# Patient Record
Sex: MALE | Race: WHITE | Employment: OTHER | RURAL
[De-identification: names, ages, dates, MRNs, and addresses within clinical notes are randomized per-mention and may not be internally consistent; named-entity substitution may affect disease eponyms.]

---

## 2017-03-30 ENCOUNTER — OFFICE VISIT (OUTPATIENT)
Dept: SURGERY | Age: 72
End: 2017-03-30

## 2017-03-30 VITALS
WEIGHT: 194.3 LBS | DIASTOLIC BLOOD PRESSURE: 67 MMHG | HEART RATE: 70 BPM | HEIGHT: 71 IN | BODY MASS INDEX: 27.2 KG/M2 | SYSTOLIC BLOOD PRESSURE: 157 MMHG

## 2017-03-30 DIAGNOSIS — C44.212 BCC (BASAL CELL CARCINOMA), EAR, RIGHT: Primary | ICD-10-CM

## 2017-03-30 RX ORDER — WARFARIN 4 MG/1
4 TABLET ORAL DAILY
COMMUNITY

## 2017-03-31 NOTE — PROGRESS NOTES
Area on right ear. Unsure how long it has been there. A little sore and bleeds. EXAM:  1 cm ulcerated lesion on right ear lobe    Betadine prep  1% Xylocaine ear block  Excision with 2 mm margins  Cautery  Closed with  5-0  Nylon  Dressed with triple antibiotic ointment and bandaid.   RTO 2 weeks  For suture removal.  Path sent    Electronically signed by Lesa Bourne MD

## 2017-04-13 ENCOUNTER — OFFICE VISIT (OUTPATIENT)
Dept: SURGERY | Age: 72
End: 2017-04-13

## 2017-04-13 VITALS
DIASTOLIC BLOOD PRESSURE: 64 MMHG | BODY MASS INDEX: 27.16 KG/M2 | HEART RATE: 70 BPM | HEIGHT: 71 IN | SYSTOLIC BLOOD PRESSURE: 157 MMHG | WEIGHT: 194 LBS

## 2017-04-13 DIAGNOSIS — C44.212 BCC (BASAL CELL CARCINOMA), EAR, RIGHT: Primary | ICD-10-CM

## 2017-04-13 NOTE — MR AVS SNAPSHOT
Visit Information Date & Time Provider Department Dept. Phone Encounter #  
 4/13/2017  9:20 AM Vianca Ashby, [address] [phone] [id number] Upcoming Health Maintenance Date Due Hepatitis C Screening 1945 DTaP/Tdap/Td series (1 - Tdap) 8/1/1968 FOBT Q 1 YEAR AGE 50-75 8/1/1997 ZOSTER VACCINE AGE 60> 8/1/2007 GLAUCOMA SCREENING Q2Y 8/1/2012 MEDICARE YEARLY EXAM 8/1/2012 LIPID PANEL Q1 10/14/2015 Pneumococcal 65+ High/Highest Risk (2 of 2 - PPSV23) 11/9/2015 HEMOGLOBIN A1C Q6M 11/13/2015 EYE EXAM RETINAL OR DILATED Q1 1/13/2016 FOOT EXAM Q1 5/13/2016 MICROALBUMIN Q1 5/13/2016 INFLUENZA AGE 9 TO ADULT 8/1/2016 Allergies as of 4/13/2017  Review Complete On: 4/13/2017 By: Vianca Ashby MD  
  
 Severity Noted Reaction Type Reactions Amiodarone  03/23/2016    Other (comments) Goiter, weight loss Darvon [Propoxyphene]  05/30/2013    Unknown (comments) Current Immunizations  Reviewed on 9/30/2014 Name Date Influenza Vaccine 10/14/2014, 10/2/2013 Pneumococcal Vaccine (Unspecified Type) 11/9/2010 Not reviewed this visit Vitals BP Pulse Height(growth percentile) Weight(growth percentile) BMI Smoking Status 157/64 (BP 1 Location: Left arm, BP Patient Position: Sitting) 70 5' 11\" (1.803 m) 194 lb (88 kg) 27.06 kg/m2 Former Smoker Vitals History BMI and BSA Data Body Mass Index Body Surface Area  
 27.06 kg/m 2 2.1 m 2 Preferred Pharmacy Pharmacy Name Phone [facility] [address] [place] - [address] [phone] Your Updated Medication List  
  
   
This list is accurate as of: 4/13/17  9:32 AM.  Always use your most recent med list.  
  
  
  
  
 aspirin delayed-release 81 mg tablet Take  by mouth daily. carvedilol 25 mg tablet Commonly known as:  Xi Link Take 1 Tab by mouth two (2) times daily (with meals). cloNIDine HCl 0.2 mg tablet Commonly known as:  CATAPRES Take 1 Tab by mouth three (3) times daily. epoetin boubacar 20,000 unit/mL injection Commonly known as:  PROCRIT  
1 mL by SubCUTAneous route every fourteen (14) days. To maintain Hgb above 10  Indications: ANEMIA DUE TO RENAL FAILURE  
  
 folic acid 1 mg tablet Commonly known as:  FOLVITE  
TAKE ONE TABLET BY MOUTH ONCE DAILY Tacnzjeh-Ujpm-Zjatkk-Hyalur Ac 800-299-65-2 mg Cap Take  by mouth.  
  
 glyBURIDE micronized 6 mg tablet Commonly known as:  Susie Peels TAKE 1 TABLET BY MOUTH BEFORE BREAKFAST AND DINNER  
  
 KALEXATE powder Generic drug:  sodium polystyrene Take 15 g by mouth now. Monday, Wed and Friday. Krill Oil 1000-130(40-80) mg Cap Generic drug:  Krill-OM3-DHA-EPA-OM6-Lip-Astx Take  by mouth. LOSARTAN PO Take  by mouth. NIFEdipine ER 60 mg ER tablet Commonly known as:  PROCARDIA XL Take 60 mg by mouth daily. pravastatin 80 mg tablet Commonly known as:  PRAVACHOL Take 1 tablet by mouth nightly. warfarin 4 mg tablet Commonly known as:  COUMADIN Take 4 mg by mouth daily. Introducing \[facility]\"" & HEALTH SERVICES! Karo Shaw introduces [employer] patient portal. Now you can access parts of your medical record, email your doctor's office, and request medication refills online. 1. In your internet browser, go to [email or web address] 2. Click on the First Time User? Click Here link in the Sign In box. You will see the New Member Sign Up page. 3. Enter your [employer] Access Code exactly as it appears below. You will not need to use this code after youve completed the sign-up process. If you do not sign up before the expiration date, you must request a new code. · [employer] Access Code: [email or web address] Expires: 6/29/2017  1:10 PM 
 
4.  Enter the last four digits of your Social Security Number (xxxx) and Date of Birth (mm/dd/yyyy) as indicated and click Submit. You will be taken to the next sign-up page. 5. Create a [employer] ID. This will be your [employer] login ID and cannot be changed, so think of one that is secure and easy to remember. 6. Create a [employer] password. You can change your password at any time. 7. Enter your Password Reset Question and Answer. This can be used at a later time if you forget your password. 8. Enter your e-mail address. You will receive e-mail notification when new information is available in [address] 9. Click Sign Up. You can now view and download portions of your medical record. 10. Click the Download Summary menu link to download a portable copy of your medical information. If you have questions, please visit the Frequently Asked Questions section of the [employer] website. Remember, [employer] is NOT to be used for urgent needs. For medical emergencies, dial 911. Now available from your iPhone and Android! Please provide this summary of care documentation to your next provider. Your primary care clinician is listed as Marietta Mccarthy. If you have any questions after today's visit, please call [phone].

## 2017-04-13 NOTE — PROGRESS NOTES
Pathology discussed with patient, BCC all gone. Wound looks good.   Sutures out  Steri-stripped  RTO prn

## 2021-09-10 ENCOUNTER — APPOINTMENT (OUTPATIENT)
Dept: CT IMAGING | Age: 76
End: 2021-09-10
Attending: EMERGENCY MEDICINE
Payer: MEDICARE

## 2021-09-10 ENCOUNTER — HOSPITAL ENCOUNTER (EMERGENCY)
Age: 76
Discharge: HOME | End: 2021-09-10
Attending: EMERGENCY MEDICINE
Payer: MEDICARE

## 2021-09-10 VITALS
OXYGEN SATURATION: 93 % | HEART RATE: 71 BPM | DIASTOLIC BLOOD PRESSURE: 92 MMHG | TEMPERATURE: 97.8 F | RESPIRATION RATE: 18 BRPM | SYSTOLIC BLOOD PRESSURE: 158 MMHG

## 2021-09-10 DIAGNOSIS — E16.2 HYPOGLYCEMIA: Primary | ICD-10-CM

## 2021-09-10 LAB
ALBUMIN SERPL-MCNC: 2.9 G/DL (ref 3.5–5)
ALBUMIN/GLOB SERPL: 0.9 {RATIO} (ref 1.1–2.2)
ALP SERPL-CCNC: 128 U/L (ref 45–117)
ALT SERPL-CCNC: 11 U/L (ref 12–78)
ANION GAP SERPL CALC-SCNC: 7 MMOL/L (ref 5–15)
APPEARANCE UR: CLEAR
AST SERPL-CCNC: 13 U/L (ref 15–37)
BACTERIA URNS QL MICRO: NEGATIVE /HPF
BASOPHILS # BLD: 0.1 K/UL (ref 0–0.1)
BASOPHILS NFR BLD: 1 % (ref 0–1)
BILIRUB SERPL-MCNC: 0.8 MG/DL (ref 0.2–1)
BILIRUB UR QL: NEGATIVE
BUN SERPL-MCNC: 26 MG/DL (ref 6–20)
BUN/CREAT SERPL: 9 (ref 12–20)
CALCIUM SERPL-MCNC: 8.4 MG/DL (ref 8.5–10.1)
CHLORIDE SERPL-SCNC: 105 MMOL/L (ref 97–108)
CO2 SERPL-SCNC: 27 MMOL/L (ref 21–32)
COLOR UR: ABNORMAL
COMMENT, HOLDF: NORMAL
CREAT SERPL-MCNC: 2.75 MG/DL (ref 0.7–1.3)
DIFFERENTIAL METHOD BLD: ABNORMAL
EOSINOPHIL # BLD: 0 K/UL (ref 0–0.4)
EOSINOPHIL NFR BLD: 0 % (ref 0–7)
EPITH CASTS URNS QL MICRO: NORMAL /LPF
ERYTHROCYTE [DISTWIDTH] IN BLOOD BY AUTOMATED COUNT: 24.3 % (ref 11.5–14.5)
GLOBULIN SER CALC-MCNC: 3.3 G/DL (ref 2–4)
GLUCOSE BLD STRIP.AUTO-MCNC: 127 MG/DL (ref 65–117)
GLUCOSE BLD STRIP.AUTO-MCNC: 141 MG/DL (ref 65–117)
GLUCOSE BLD STRIP.AUTO-MCNC: 151 MG/DL (ref 65–117)
GLUCOSE BLD STRIP.AUTO-MCNC: 46 MG/DL (ref 65–117)
GLUCOSE SERPL-MCNC: 188 MG/DL (ref 65–100)
GLUCOSE UR STRIP.AUTO-MCNC: NEGATIVE MG/DL
HCT VFR BLD AUTO: 31.9 % (ref 36.6–50.3)
HGB BLD-MCNC: 9.6 G/DL (ref 12.1–17)
HGB UR QL STRIP: NEGATIVE
IMM GRANULOCYTES # BLD AUTO: 0 K/UL (ref 0–0.04)
IMM GRANULOCYTES NFR BLD AUTO: 0 % (ref 0–0.5)
INR PPP: 1.2 (ref 0.9–1.1)
KETONES UR QL STRIP.AUTO: NEGATIVE MG/DL
LEUKOCYTE ESTERASE UR QL STRIP.AUTO: NEGATIVE
LYMPHOCYTES # BLD: 1.1 K/UL (ref 0.8–3.5)
LYMPHOCYTES NFR BLD: 15 % (ref 12–49)
MAGNESIUM SERPL-MCNC: 1.7 MG/DL (ref 1.6–2.4)
MCH RBC QN AUTO: 25.7 PG (ref 26–34)
MCHC RBC AUTO-ENTMCNC: 30.1 G/DL (ref 30–36.5)
MCV RBC AUTO: 85.3 FL (ref 80–99)
MONOCYTES # BLD: 0.7 K/UL (ref 0–1)
MONOCYTES NFR BLD: 10 % (ref 5–13)
NEUTS SEG # BLD: 5.1 K/UL (ref 1.8–8)
NEUTS SEG NFR BLD: 74 % (ref 32–75)
NITRITE UR QL STRIP.AUTO: NEGATIVE
NRBC # BLD: 0 K/UL (ref 0–0.01)
NRBC BLD-RTO: 0 PER 100 WBC
PH UR STRIP: 7.5 [PH] (ref 5–8)
PLATELET # BLD AUTO: 206 K/UL (ref 150–400)
PMV BLD AUTO: 9.9 FL (ref 8.9–12.9)
POTASSIUM SERPL-SCNC: 4.8 MMOL/L (ref 3.5–5.1)
PROT SERPL-MCNC: 6.2 G/DL (ref 6.4–8.2)
PROT UR STRIP-MCNC: 30 MG/DL
PROTHROMBIN TIME: 11.9 SEC (ref 9–11.1)
RBC # BLD AUTO: 3.74 M/UL (ref 4.1–5.7)
RBC #/AREA URNS HPF: NORMAL /HPF (ref 0–5)
RBC MORPH BLD: ABNORMAL
SAMPLES BEING HELD,HOLD: NORMAL
SERVICE CMNT-IMP: ABNORMAL
SODIUM SERPL-SCNC: 139 MMOL/L (ref 136–145)
SP GR UR REFRACTOMETRY: 1.01 (ref 1–1.03)
UROBILINOGEN UR QL STRIP.AUTO: 1 EU/DL (ref 0.2–1)
WBC # BLD AUTO: 7 K/UL (ref 4.1–11.1)
WBC URNS QL MICRO: NORMAL /HPF (ref 0–4)

## 2021-09-10 PROCEDURE — 85025 COMPLETE CBC W/AUTO DIFF WBC: CPT

## 2021-09-10 PROCEDURE — 83735 ASSAY OF MAGNESIUM: CPT

## 2021-09-10 PROCEDURE — 74011250636 HC RX REV CODE- 250/636: Performed by: EMERGENCY MEDICINE

## 2021-09-10 PROCEDURE — 99285 EMERGENCY DEPT VISIT HI MDM: CPT

## 2021-09-10 PROCEDURE — 96374 THER/PROPH/DIAG INJ IV PUSH: CPT

## 2021-09-10 PROCEDURE — 81001 URINALYSIS AUTO W/SCOPE: CPT

## 2021-09-10 PROCEDURE — 70450 CT HEAD/BRAIN W/O DYE: CPT

## 2021-09-10 PROCEDURE — 82962 GLUCOSE BLOOD TEST: CPT

## 2021-09-10 PROCEDURE — 96361 HYDRATE IV INFUSION ADD-ON: CPT

## 2021-09-10 PROCEDURE — 80053 COMPREHEN METABOLIC PANEL: CPT

## 2021-09-10 PROCEDURE — 74011000250 HC RX REV CODE- 250: Performed by: EMERGENCY MEDICINE

## 2021-09-10 PROCEDURE — 36415 COLL VENOUS BLD VENIPUNCTURE: CPT

## 2021-09-10 PROCEDURE — 85610 PROTHROMBIN TIME: CPT

## 2021-09-10 RX ORDER — DEXTROSE 50 % IN WATER (D50W) INTRAVENOUS SYRINGE
25
Status: COMPLETED | OUTPATIENT
Start: 2021-09-10 | End: 2021-09-10

## 2021-09-10 RX ORDER — SODIUM CHLORIDE 0.9 % (FLUSH) 0.9 %
5-10 SYRINGE (ML) INJECTION ONCE
Status: DISCONTINUED | OUTPATIENT
Start: 2021-09-10 | End: 2021-09-10 | Stop reason: HOSPADM

## 2021-09-10 RX ADMIN — SODIUM CHLORIDE 1000 ML: 9 INJECTION, SOLUTION INTRAVENOUS at 09:38

## 2021-09-10 RX ADMIN — DEXTROSE MONOHYDRATE 25 G: 25 INJECTION, SOLUTION INTRAVENOUS at 08:20

## 2021-09-10 NOTE — ED TRIAGE NOTES
Pt reports falling off the couch while he was asleep this morning. Pt arrived via rescue with pt axo x4, no pain, with multiple skin tears on arm and legs , some of which he reports being from a previous fall. BSG=46, MD aware, PIV established and D50 administered

## 2021-09-10 NOTE — ED NOTES
Hourly rounding completed, assessed need for restroom and pain level. Pathway clear from obstructions and personal belongings within reach. Repositioned for comfort.   Breakfast tray provided and set up , pt feeding himself without issues

## 2021-09-10 NOTE — ED PROVIDER NOTES
EMERGENCY DEPARTMENT HISTORY AND PHYSICAL EXAM          Date: 9/10/2021  Patient Name: Jennifer Garcia. History of Presenting Illness     Chief Complaint   Patient presents with    Fall       History Provided By: Patient    HPI: Jennifer Garcia. is a 78 y.o. male, pmhx diabetes, hypertension, high cholesterol, CHF, atrial fibrillation on Coumadin end-stage renal disease status post transplant and no longer requiring dialysis who presents via rescue squad to the ED c/o fall    Patient states he was sleeping on the couch because he fell asleep watching TV last night. He rolled off and fell landing on both arms. He is able to get to his phone and called the rescue squad as he lives alone. On their arrival they found a pleasant elderly male with a blood sugar 57. They did not intervene and transported patient without event. Patient states he currently feels fine. He states his blood sugars are always low in the mornings and he is used to it as they are normally around 60. Patient states he has not talked to his primary care or his endocrinologist about these abnormal numbers. He does note he was recently admitted at [facility] for elevated blood sugar and they recommend he change his 70/25 to Lantus but he has not yet started the medications because he was finishing off his prior insulin. Currently he is taking 10 units twice daily without any sliding scale coverage. Patient specifically denies any recent fevers, chills, nausea, vomiting, diarrhea, abd pain, CP, SOB, urinary sxs, changes in BM, or headache. PCP: Karen Sanderson NP    Allergies: Amiodarone and Darvon  Social Hx: -tobacco, -vaping, -EtOH, -Illicit Drugs; Lives alone    There are no other complaints, changes, or physical findings at this time. Current Outpatient Medications   Medication Sig Dispense Refill    warfarin (COUMADIN) 4 mg tablet Take 4 mg by mouth daily.  LOSARTAN PO Take  by mouth.       folic acid (FOLVITE) 1 mg tablet TAKE ONE TABLET BY MOUTH ONCE DAILY 30 Tab 0    NIFEdipine ER (PROCARDIA XL) 60 mg ER tablet Take 60 mg by mouth daily.  epoetin boubacar (PROCRIT) 20,000 unit/mL injection 1 mL by SubCUTAneous route every fourteen (14) days. To maintain Hgb above 10  Indications: ANEMIA DUE TO RENAL FAILURE (Patient taking differently: 10,000 Units by SubCUTAneous route every fourteen (14) days. To maintain Hgb above 10  Indications: ANEMIA DUE TO RENAL FAILURE) 6 Vial 11    pravastatin (PRAVACHOL) 80 mg tablet Take 1 tablet by mouth nightly. 90 tablet 3    cloNIDine (CATAPRES) 0.2 mg tablet Take 1 Tab by mouth three (3) times daily. 270 Tab 3    carvedilol (COREG) 25 mg tablet Take 1 Tab by mouth two (2) times daily (with meals). 180 Tab 3    aspirin delayed-release 81 mg tablet Take  by mouth daily.  Akmcjnpy-Mhtl-Fjosfg-Hyalur Ac 812-251-51-2 mg cap Take  by mouth.  Krill-OM3-DHA-EPA-OM6-Lip-Astx (KRILL OIL) 1000-130(40-80) mg cap Take  by mouth.  sodium polystyrene (KALEXATE) powder Take 15 g by mouth now. Monday, Wed and Friday.          Past History     Past Medical History:  Past Medical History:   Diagnosis Date    Anemia in chronic renal disease     Asthma     childhood    BPH (benign prostatic hyperplasia)     Cardiorenal syndrome without renal failure     Congestive heart failure, unspecified     Diabetes ([facility] [address]     ED (erectile dysfunction)     History of colon polyps     Hypercholesterolemia     Hypertension     Perirectal abscess        Past Surgical History:  Past Surgical History:   Procedure Laterality Date    HX APPENDECTOMY  1982    ruptured    HX CAROTID ENDARTERECTOMY      HX CATARACT REMOVAL      HX COLONOSCOPY  2015    HX CORONARY ARTERY BYPASS GRAFT      2 vessel    HX CYST REMOVAL      HX HERNIA REPAIR      HX RENAL TRANSPLANT         Family History:  Family History   Problem Relation Age of Onset    Heart Disease Other     Diabetes Other     No Known Problems Mother        Social History:  Social History     Tobacco Use    Smoking status: Former Smoker     Quit date: 1988     Years since quittin.8    Smokeless tobacco: Former User   Substance Use Topics    Alcohol use: No     Comment: minimal    Drug use: No       Allergies: Allergies   Allergen Reactions    Amiodarone Other (comments)     Goiter, weight loss    Darvon [Propoxyphene] Unknown (comments)         Review of Systems   Review of Systems   Constitutional: Negative for activity change, appetite change, chills, fever and unexpected weight change. HENT: Negative for congestion. Eyes: Negative for pain and visual disturbance. Respiratory: Negative for cough and shortness of breath. Cardiovascular: Negative for chest pain. Gastrointestinal: Negative for abdominal pain, diarrhea, nausea and vomiting. Genitourinary: Negative for dysuria. Musculoskeletal: Negative for back pain. Skin: Positive for wound (Bilateral elbows). Negative for rash. Neurological: Negative for headaches. Physical Exam   Physical Exam  Vitals and nursing note reviewed. Constitutional:       Appearance: He is well-developed. He is not diaphoretic. Comments: This is a disheveled elderly male sitting comfortably with elevated blood pressure currently in mild to moderate distress   HENT:      Head: Normocephalic and atraumatic. Eyes:      General:         Right eye: No discharge. Left eye: No discharge. Conjunctiva/sclera: Conjunctivae normal.      Pupils: Pupils are equal, round, and reactive to light. Cardiovascular:      Rate and Rhythm: Normal rate and regular rhythm. Heart sounds: Normal heart sounds. No murmur heard. No friction rub. No gallop. Comments: Fistula right upper extremity with good thrill. Pacemaker left chest  Pulmonary:      Effort: Pulmonary effort is normal. No respiratory distress. Breath sounds: Normal breath sounds.  No wheezing or rales.   Chest:      Chest wall: No tenderness. Abdominal:      General: Bowel sounds are normal. There is no distension. Palpations: Abdomen is soft. There is no mass. Tenderness: There is no abdominal tenderness. There is no guarding or rebound. Hernia: No hernia is present. Musculoskeletal:         General: Normal range of motion. Cervical back: Normal range of motion and neck supple. Right lower leg: No edema. Left lower leg: No edema. Skin:     General: Skin is warm and dry. Coloration: Skin is not jaundiced or pale. Findings: Bruising (Scattered to bilateral upper extremities, bilateral lower extremities and lower abdomen) present. No rash. Comments: Skin tear left forearm and bilateral elbows   Neurological:      Mental Status: He is alert and oriented to person, place, and time. Cranial Nerves: No cranial nerve deficit. Motor: No abnormal muscle tone. Comments: Tremor noted right upper extremity       Diagnostic Study Results     Labs -     Recent Results (from the past 12 hour(s))   GLUCOSE, POC    Collection Time: 09/10/21  8:14 AM   Result Value Ref Range    Glucose (POC) 46 (LL) 65 - 117 mg/dL    Performed by Abdirashid TURPIN)    CBC WITH AUTOMATED DIFF    Collection Time: 09/10/21  8:28 AM   Result Value Ref Range    WBC 7.0 4.1 - 11.1 K/uL    RBC 3.74 (L) 4.10 - 5.70 M/uL    HGB 9.6 (L) 12.1 - 17.0 g/dL    HCT 31.9 (L) 36.6 - 50.3 %    MCV 85.3 80.0 - 99.0 FL    MCH 25.7 (L) 26.0 - 34.0 PG    MCHC 30.1 30.0 - 36.5 g/dL    RDW 24.3 (H) 11.5 - 14.5 %    PLATELET 616 050 - 080 K/uL    MPV 9.9 8.9 - 12.9 FL    NRBC 0.0 0  WBC    ABSOLUTE NRBC 0.00 0.00 - 0.01 K/uL    NEUTROPHILS 74 32 - 75 %    LYMPHOCYTES 15 12 - 49 %    MONOCYTES 10 5 - 13 %    EOSINOPHILS 0 0 - 7 %    BASOPHILS 1 0 - 1 %    IMMATURE GRANULOCYTES 0 0.0 - 0.5 %    ABS. NEUTROPHILS 5.1 1.8 - 8.0 K/UL    ABS. LYMPHOCYTES 1.1 0.8 - 3.5 K/UL    ABS.  MONOCYTES 0.7 0.0 - 1.0 K/UL    ABS. EOSINOPHILS 0.0 0.0 - 0.4 K/UL    ABS. BASOPHILS 0.1 0.0 - 0.1 K/UL    ABS. IMM. GRANS. 0.0 0.00 - 0.04 K/UL    DF AUTOMATED      RBC COMMENTS OVALOCYTES  1+       METABOLIC PANEL, COMPREHENSIVE    Collection Time: 09/10/21  8:28 AM   Result Value Ref Range    Sodium 139 136 - 145 mmol/L    Potassium 4.8 3.5 - 5.1 mmol/L    Chloride 105 97 - 108 mmol/L    CO2 27 21 - 32 mmol/L    Anion gap 7 5 - 15 mmol/L    Glucose 188 (H) 65 - 100 mg/dL    BUN 26 (H) 6 - 20 MG/DL    Creatinine 2.75 (H) 0.70 - 1.30 MG/DL    BUN/Creatinine ratio 9 (L) 12 - 20      GFR est AA 28 (L) >60 ml/min/1.73m2    GFR est non-AA 23 (L) >60 ml/min/1.73m2    Calcium 8.4 (L) 8.5 - 10.1 MG/DL    Bilirubin, total 0.8 0.2 - 1.0 MG/DL    ALT (SGPT) 11 (L) 12 - 78 U/L    AST (SGOT) 13 (L) 15 - 37 U/L    Alk. phosphatase 128 (H) 45 - 117 U/L    Protein, total 6.2 (L) 6.4 - 8.2 g/dL    Albumin 2.9 (L) 3.5 - 5.0 g/dL    Globulin 3.3 2.0 - 4.0 g/dL    A-G Ratio 0.9 (L) 1.1 - 2.2     MAGNESIUM    Collection Time: 09/10/21  8:28 AM   Result Value Ref Range    Magnesium 1.7 1.6 - 2.4 mg/dL   SAMPLES BEING HELD    Collection Time: 09/10/21  8:28 AM   Result Value Ref Range    SAMPLES BEING HELD 1SST, 1RED, 1BLUE     COMMENT        Add-on orders for these samples will be processed based on acceptable specimen integrity and analyte stability, which may vary by analyte.    PROTHROMBIN TIME + INR    Collection Time: 09/10/21  8:28 AM   Result Value Ref Range    INR 1.2 (H) 0.9 - 1.1      Prothrombin time 11.9 (H) 9.0 - 11.1 sec   GLUCOSE, POC    Collection Time: 09/10/21  8:35 AM   Result Value Ref Range    Glucose (POC) 151 (H) 65 - 117 mg/dL    Performed by Rito Martínez ([place])    URINALYSIS W/ RFLX MICROSCOPIC    Collection Time: 09/10/21  8:39 AM   Result Value Ref Range    Color YELLOW/STRAW      Appearance CLEAR CLEAR      Specific gravity 1.015 1.003 - 1.030      pH (UA) 7.5 5.0 - 8.0      Protein 30 (A) NEG mg/dL    Glucose Negative NEG mg/dL    Ketone Negative NEG mg/dL    Bilirubin Negative NEG      Blood Negative NEG      Urobilinogen 1.0 0.2 - 1.0 EU/dL    Nitrites Negative NEG      Leukocyte Esterase Negative NEG     URINE MICROSCOPIC ONLY    Collection Time: 09/10/21  8:39 AM   Result Value Ref Range    WBC 0-4 0 - 4 /hpf    RBC 0-5 0 - 5 /hpf    Epithelial cells FEW FEW /lpf    Bacteria Negative NEG /hpf   GLUCOSE, POC    Collection Time: 09/10/21  9:32 AM   Result Value Ref Range    Glucose (POC) 127 (H) 65 - 117 mg/dL    Performed by Jazz Sherman (RN)    GLUCOSE, POC    Collection Time: 09/10/21 10:39 AM   Result Value Ref Range    Glucose (POC) 141 (H) 65 - 117 mg/dL    Performed by Lilian METZGER        Radiologic Studies -   CT HEAD WO CONT   Final Result   No acute abnormality. CT Results  (Last 48 hours)               09/10/21 0903  CT HEAD WO CONT Final result    Impression:  No acute abnormality. Narrative:  EXAM: CT HEAD WO CONT       INDICATION: fall on coumadin       COMPARISON: None. CONTRAST: None. TECHNIQUE: Unenhanced CT of the head was performed using 5 mm images. Brain and   bone windows were generated. Coronal and sagittal reformats. CT dose reduction   was achieved through use of a standardized protocol tailored for this   examination and automatic exposure control for dose modulation. FINDINGS:   The ventricles and sulci are normal in size, shape and configuration. . There is   no significant white matter disease. There is no intracranial hemorrhage,   extra-axial collection, or mass effect. The basilar cisterns are open. No CT   evidence of acute infarct. The bone windows demonstrate no abnormalities. The visualized portions of the   paranasal sinuses and mastoid air cells are clear. Vascular calcification is   noted. CXR Results  (Last 48 hours)    None            Medical Decision Making   I am the first provider for this patient.     I reviewed the vital signs, available nursing notes, past medical history, past surgical history, family history and social history. Vital Signs-Reviewed the patient's vital signs. Patient Vitals for the past 12 hrs:   Temp Pulse Resp BP SpO2   09/10/21 1123  71 18 (!) 158/92 93 %   09/10/21 0939  74 20 (!) 188/89 94 %   09/10/21 0843   18     09/10/21 0826 97.8 °F (36.6 °C) 70 26 (!) 170/89 95 %       Pulse Oximetry Analysis - 95% on RA    Cardiac Monitor:   Rate: 70bpm  Rhythm: Paced      Records Reviewed: Nursing Notes, Old Medical Records, Previous Radiology Studies and Previous Laboratory Studies    Provider Notes (Medical Decision Making):   MDM: Elderly diabetic who lives alone and seems to be managing self okay but he is definitely not using all resources available to him to control his diabetes. He did see any evidence of acute traumatic injury requiring x-ray at this time but given his Coumadin use, CT head to be obtained to rule out any intracranial pathology. ED Course:   Initial assessment performed. The patients presenting problems have been discussed, and they are in agreement with the care plan formulated and outlined with them. I have encouraged them to ask questions as they arise throughout their visit. PROGRESS NOTE:    ED Course as of Sep 10 1724   Fri Sep 10, 2021   0910 Labs reviewed and notable for creatinine of 2.75. Last set of labs from 1 year ago showed it at 2.7. When patient urinated his urine is orange but does have protein. Specific gravity 1.015. IV fluids to be provided, we will recheck his sugar and if CT normal will allow him to eat breakfast.    [JT]      ED Course User Index  [JT] Hanh Ulloa MD      10 AM  Patient doing well blood sugar remains greater than 100 patient will eat breakfast and will continue to monitor. 11:15 AM  Blood sugar remains normal.  Patient appears appropriate for discharge.   We discussed the fact that he is hypoglycemic every morning and that patient has not told his primary care doctor. I explained that his doctor would want to know this so it is endocrinologist.  Jade Blair he decrease his nighttime insulin but he was told by his endocrinologist if ever we wanted to adjust his medication that he did need to stop his glyburide. I do recommend he monitors his blood sugars over the weekend and follow-up with his primary care to review the Monday while he awaits his appointment with endocrinology. Discharge note:  Pt re-evaluated and noted to be feeling better, ready for discharge. Updated pt on all final lab findings. Will follow up as instructed. All questions have been answered, pt voiced understanding and agreement with plan. Specific return precautions provided as well as instructions to return to the ED should sx worsen at any time. Vital signs stable for discharge. Critical Care Time:   0      Diagnosis     Clinical Impression:   1. Hypoglycemia        PLAN:  1. Discharge Medication List as of 9/10/2021 10:58 AM      CONTINUE these medications which have NOT CHANGED    Details   warfarin (COUMADIN) 4 mg tablet Take 4 mg by mouth daily. , Historical Med      LOSARTAN PO Take  by mouth., Historical Med      folic acid (FOLVITE) 1 mg tablet TAKE ONE TABLET BY MOUTH ONCE DAILY, NormalPlease advise pt has 30 days to be seen has not been seen in over a year and that we do appts only nowDisp-30 Tab, R-0      NIFEdipine ER (PROCARDIA XL) 60 mg ER tablet Take 60 mg by mouth daily. , Historical Med      epoetin boubacar (PROCRIT) 20,000 unit/mL injection 1 mL by SubCUTAneous route every fourteen (14) days. To maintain Hgb above 10  Indications: ANEMIA DUE TO RENAL FAILURE, Normal, Disp-6 Vial, R-11      pravastatin (PRAVACHOL) 80 mg tablet Take 1 tablet by mouth nightly., Normal, Disp-90 tablet, R-3      cloNIDine (CATAPRES) 0.2 mg tablet Take 1 Tab by mouth three (3) times daily. , Normal, Disp-270 Tab, R-3      carvedilol (COREG) 25 mg tablet Take 1 Tab by mouth two (2) times daily (with meals). , Normal, Disp-180 Tab, R-3      aspirin delayed-release 81 mg tablet Take  by mouth daily. , Historical Med      Zgexybwf-Nkfl-Vgnwme-Hyalur Ac 466-846-75-2 mg cap Take  by mouth., Historical Med      Krill-OM3-DHA-EPA-OM6-Lip-Astx (KRILL OIL) 1000-130(40-80) mg cap Take  by mouth., Historical Med      sodium polystyrene (KALEXATE) powder Take 15 g by mouth now. Monday, Wed and Friday., Historical Med           2. Follow-up Information     Follow up With Specialties Details Why Contact Info    Mayda Morillo NP Nurse Practitioner Schedule an appointment as soon as possible for a visit  to recheck your vitals and review blood sugar levels Monday [address] Emergency Medicine  If symptoms worsen with continued low blood sugar [address]  [address] [facility] [address] [phone]        Return to ED if worse     Disposition:  Home       Please note, this dictation was completed with [email or web address], the [employer] voice recognition software. Quite often unanticipated grammatical, syntax, homophones, and other interpretive errors are inadvertently transcribed by the computer software. Please disregard these errors. Please excuse any errors that have escaped final proof reading.

## 2021-09-10 NOTE — DISCHARGE INSTRUCTIONS
You were seen in the ER for a fall and low blood sugar. Given that your blood sugars are low every morning, I think you should stop taking your glipizide.   Please call your primary care doctor to make an appointment on Monday and review your blood sugars from over the weekend while you are awaiting your appointment with your endocrinologist.

## 2021-09-10 NOTE — ED NOTES
I have reviewed discharge instructions with the patient. The patient verbalized understanding. Discharge medications discussed with patient. No questions at this time.

## 2021-09-17 ENCOUNTER — APPOINTMENT (OUTPATIENT)
Dept: CT IMAGING | Age: 76
End: 2021-09-17
Attending: FAMILY MEDICINE
Payer: MEDICARE

## 2021-09-17 ENCOUNTER — HOSPITAL ENCOUNTER (EMERGENCY)
Age: 76
Discharge: HOME | End: 2021-09-18
Attending: FAMILY MEDICINE
Payer: MEDICARE

## 2021-09-17 VITALS
OXYGEN SATURATION: 96 % | TEMPERATURE: 97.9 F | HEART RATE: 76 BPM | BODY MASS INDEX: 28.31 KG/M2 | WEIGHT: 203 LBS | SYSTOLIC BLOOD PRESSURE: 194 MMHG | RESPIRATION RATE: 16 BRPM | DIASTOLIC BLOOD PRESSURE: 97 MMHG

## 2021-09-17 DIAGNOSIS — E83.42 HYPOMAGNESEMIA: ICD-10-CM

## 2021-09-17 DIAGNOSIS — T14.8XXA MULTIPLE SKIN TEARS: ICD-10-CM

## 2021-09-17 DIAGNOSIS — E16.2 HYPOGLYCEMIA: Primary | ICD-10-CM

## 2021-09-17 DIAGNOSIS — R29.6 FALLS FREQUENTLY: ICD-10-CM

## 2021-09-17 LAB
ALBUMIN SERPL-MCNC: 3.1 G/DL (ref 3.5–5)
ALBUMIN/GLOB SERPL: 0.8 {RATIO} (ref 1.1–2.2)
ALP SERPL-CCNC: 128 U/L (ref 45–117)
ALT SERPL-CCNC: 10 U/L (ref 12–78)
ANION GAP SERPL CALC-SCNC: 14 MMOL/L (ref 5–15)
AST SERPL-CCNC: 14 U/L (ref 15–37)
BILIRUB SERPL-MCNC: 1.5 MG/DL (ref 0.2–1)
BUN SERPL-MCNC: 20 MG/DL (ref 6–20)
BUN/CREAT SERPL: 10 (ref 12–20)
CALCIUM SERPL-MCNC: 8.8 MG/DL (ref 8.5–10.1)
CHLORIDE SERPL-SCNC: 102 MMOL/L (ref 97–108)
CO2 SERPL-SCNC: 23 MMOL/L (ref 21–32)
CREAT SERPL-MCNC: 2.06 MG/DL (ref 0.7–1.3)
GLOBULIN SER CALC-MCNC: 3.8 G/DL (ref 2–4)
GLUCOSE SERPL-MCNC: 108 MG/DL (ref 65–100)
INR PPP: 1.2 (ref 0.9–1.1)
MAGNESIUM SERPL-MCNC: 1.5 MG/DL (ref 1.6–2.4)
POTASSIUM SERPL-SCNC: 4.5 MMOL/L (ref 3.5–5.1)
PROT SERPL-MCNC: 6.9 G/DL (ref 6.4–8.2)
PROTHROMBIN TIME: 12.1 SEC (ref 9–11.1)
SODIUM SERPL-SCNC: 139 MMOL/L (ref 136–145)

## 2021-09-17 PROCEDURE — 99284 EMERGENCY DEPT VISIT MOD MDM: CPT

## 2021-09-17 PROCEDURE — 36415 COLL VENOUS BLD VENIPUNCTURE: CPT

## 2021-09-17 PROCEDURE — 85610 PROTHROMBIN TIME: CPT

## 2021-09-17 PROCEDURE — 70450 CT HEAD/BRAIN W/O DYE: CPT

## 2021-09-17 PROCEDURE — 85025 COMPLETE CBC W/AUTO DIFF WBC: CPT

## 2021-09-17 PROCEDURE — 80053 COMPREHEN METABOLIC PANEL: CPT

## 2021-09-17 PROCEDURE — 72125 CT NECK SPINE W/O DYE: CPT

## 2021-09-17 PROCEDURE — 83735 ASSAY OF MAGNESIUM: CPT

## 2021-09-17 RX ORDER — CALCIUM CARBONATE 300MG(750)
400 TABLET,CHEWABLE ORAL DAILY
Qty: 30 EACH | Refills: 0 | Status: SHIPPED | OUTPATIENT
Start: 2021-09-17 | End: 2021-10-17

## 2021-09-17 RX ORDER — SODIUM CHLORIDE 0.9 % (FLUSH) 0.9 %
5-10 SYRINGE (ML) INJECTION ONCE
Status: DISCONTINUED | OUTPATIENT
Start: 2021-09-17 | End: 2021-09-18 | Stop reason: HOSPADM

## 2021-09-18 LAB
BASOPHILS # BLD: 0 K/UL (ref 0–0.1)
BASOPHILS NFR BLD: 0 % (ref 0–1)
DIFFERENTIAL METHOD BLD: ABNORMAL
EOSINOPHIL # BLD: 0 K/UL (ref 0–0.4)
EOSINOPHIL NFR BLD: 0 % (ref 0–7)
ERYTHROCYTE [DISTWIDTH] IN BLOOD BY AUTOMATED COUNT: 22.6 % (ref 11.5–14.5)
HCT VFR BLD AUTO: 35.3 % (ref 36.6–50.3)
HGB BLD-MCNC: 10.9 G/DL (ref 12.1–17)
IMM GRANULOCYTES # BLD AUTO: 0 K/UL (ref 0–0.04)
IMM GRANULOCYTES NFR BLD AUTO: 0 % (ref 0–0.5)
LYMPHOCYTES # BLD: 0.8 K/UL (ref 0.8–3.5)
LYMPHOCYTES NFR BLD: 11 % (ref 12–49)
MCH RBC QN AUTO: 26.2 PG (ref 26–34)
MCHC RBC AUTO-ENTMCNC: 30.9 G/DL (ref 30–36.5)
MCV RBC AUTO: 84.9 FL (ref 80–99)
MONOCYTES # BLD: 0.2 K/UL (ref 0–1)
MONOCYTES NFR BLD: 3 % (ref 5–13)
NEUTS BAND NFR BLD MANUAL: 3 %
NEUTS SEG # BLD: 6.3 K/UL (ref 1.8–8)
NEUTS SEG NFR BLD: 83 % (ref 32–75)
NRBC # BLD: 0 K/UL (ref 0–0.01)
NRBC BLD-RTO: 0 PER 100 WBC
PLATELET # BLD AUTO: 217 K/UL (ref 150–400)
PLATELET COMMENTS,PCOM: ABNORMAL
PMV BLD AUTO: 9.9 FL (ref 8.9–12.9)
RBC # BLD AUTO: 4.16 M/UL (ref 4.1–5.7)
RBC MORPH BLD: ABNORMAL
WBC # BLD AUTO: 7.3 K/UL (ref 4.1–11.1)

## 2021-09-18 NOTE — ED PROVIDER NOTES
Patient is a 72-year-old male with a history of asthma, BPH, congestive heart failure, diabetes, hyperlipidemia, hypertension, atrial fibrillation, V. tach, coronary artery disease who lives by himself. Patient was at home tonight when he lost his footing and fell. He states he was unable to get up secondary to weakness. He felt that his sugar was low. He has had multiple falls and multiple ED presentations for falls. He is on Coumadin for history of A. fib and V. tach. . He may have hit his head. He continues to have a headache after a fall about a week or 2 ago when he struck his head. Head CT at that time was negative. Patient also complains of upper neck pain since the fall which has been persistent. He has no numbness or weakness in the upper extremities or legs. He has no difficulty breathing. He has no chest pain heaviness pressure. No arm or jaw pain is noted. No abdominal pain is noted. No low back pain is noted. No lower extremity pain is noted. He has multiple skin tears on his right arm. 911 was called and patient was transported to hospital.  His bedside sugar was noted to be in the low 70s at home. He was given oral glucose with increase in his sugar to the upper 70s. Patient stated that he felt remarkably better. Patient stated upon arrival he felt back at baseline.            Past Medical History:   Diagnosis Date    Anemia in chronic renal disease     Asthma     childhood    BPH (benign prostatic hyperplasia)     Cardiorenal syndrome without renal failure     Congestive heart failure, unspecified     Diabetes ([address]     ED (erectile dysfunction)     History of colon polyps     Hypercholesterolemia     Hypertension     Perirectal abscess        Past Surgical History:   Procedure Laterality Date    HX APPENDECTOMY  1982    ruptured    HX CAROTID ENDARTERECTOMY      HX CATARACT REMOVAL      HX COLONOSCOPY  2015    HX CORONARY ARTERY BYPASS GRAFT      2 vessel    HX CYST REMOVAL      HX HERNIA REPAIR      HX RENAL TRANSPLANT           Family History:   Problem Relation Age of Onset    Heart Disease Other     Diabetes Other     No Known Problems Mother        Social History     Socioeconomic History    Marital status:      Spouse name: Not on file    Number of children: Not on file    Years of education: Not on file    Highest education level: Not on file   Occupational History    Not on file   Tobacco Use    Smoking status: Former Smoker     Quit date: 1988     Years since quittin.8    Smokeless tobacco: Former User   Substance and Sexual Activity    Alcohol use: No     Comment: minimal    Drug use: No    Sexual activity: Not Currently   Other Topics Concern    Not on file   Social History Narrative    Not on file     Social Determinants of Health     Financial Resource Strain:     Difficulty of Paying Living Expenses:    Food Insecurity:     Worried About Running Out of Food in the Last Year:     [address] in the Last Year:    Transportation Needs:     Lack of Transportation (Medical):  Lack of Transportation (Non-Medical):    Physical Activity:     Days of Exercise per Week:     Minutes of Exercise per Session:    Stress:     Feeling of Stress :    Social Connections:     Frequency of Communication with Friends and Family:     Frequency of Social Gatherings with Friends and Family:     Attends Zoroastrian Services:     Active Member of Clubs or Organizations:     Attends Club or Organization Meetings:     Marital Status:    Intimate Partner Violence:     Fear of Current or Ex-Partner:     Emotionally Abused:     Physically Abused:     Sexually Abused: ALLERGIES: Amiodarone and Darvon [propoxyphene]    Review of Systems   All other systems reviewed and are negative.       Vitals:    21 2112   BP: (!) 194/97   Pulse: 76   Resp: 16   Temp: 97.9 °F (36.6 °C)   SpO2: 96%   Weight: 92.1 kg (203 lb)            Physical Exam  Vitals and nursing note reviewed. Constitutional:       General: He is not in acute distress. Appearance: He is not ill-appearing, toxic-appearing or diaphoretic. Comments: Patient was somewhat unkempt and thin   HENT:      Head: Normocephalic and atraumatic. Right Ear: External ear normal.      Left Ear: External ear normal.      Nose: Nose normal.      Mouth/Throat:      Mouth: Mucous membranes are moist.      Pharynx: No oropharyngeal exudate or posterior oropharyngeal erythema. Eyes:      Extraocular Movements: Extraocular movements intact. Conjunctiva/sclera: Conjunctivae normal.      Pupils: Pupils are equal, round, and reactive to light. Neck:      Comments: Mild tenderness of paraspinous muscles noted, no midline tenderness was noted. Cardiovascular:      Rate and Rhythm: Normal rate and regular rhythm. Heart sounds: No murmur heard. No friction rub. No gallop. Pulmonary:      Effort: Pulmonary effort is normal. No respiratory distress. Breath sounds: Normal breath sounds. No wheezing or rales. Abdominal:      General: There is no distension. Palpations: Abdomen is soft. Tenderness: There is no abdominal tenderness. There is no right CVA tenderness, left CVA tenderness, guarding or rebound. Musculoskeletal:         General: Signs of injury present. No swelling, tenderness or deformity. Normal range of motion. Cervical back: Normal range of motion and neck supple. No muscular tenderness. Right lower leg: No edema. Left lower leg: No edema. Comments: Superficial skin tears noted to right elbow and right hand. No bony pain to tenderness of the extremities or axial skeleton. Lymphadenopathy:      Cervical: No cervical adenopathy. Skin:     General: Skin is warm and dry. Capillary Refill: Capillary refill takes less than 2 seconds. Findings: No bruising, erythema or rash. Comments: Skin tears as noted.   Patient also has diffuse bruising of the arms and lower extremities. Neurological:      General: No focal deficit present. Mental Status: He is alert and oriented to person, place, and time. Cranial Nerves: No cranial nerve deficit. Sensory: No sensory deficit. Motor: No weakness. Coordination: Coordination normal.      Gait: Gait normal.   Psychiatric:         Mood and Affect: Mood normal.         Behavior: Behavior normal.         Thought Content: Thought content normal.         Judgment: Judgment normal.        Labs -  Recent Results (from the past 24 hour(s))   CBC WITH AUTOMATED DIFF    Collection Time: 09/17/21 10:05 PM   Result Value Ref Range    WBC 7.3 4.1 - 11.1 K/uL    RBC 4.16 4.10 - 5.70 M/uL    HGB 10.9 (L) 12.1 - 17.0 g/dL    HCT 35.3 (L) 36.6 - 50.3 %    MCV 84.9 80.0 - 99.0 FL    MCH 26.2 26.0 - 34.0 PG    MCHC 30.9 30.0 - 36.5 g/dL    RDW 22.6 (H) 11.5 - 14.5 %    PLATELET 136 681 - 374 K/uL    MPV 9.9 8.9 - 12.9 FL    NRBC 0.0 0  WBC    ABSOLUTE NRBC 0.00 0.00 - 0.01 K/uL    NEUTROPHILS 83 (H) 32 - 75 %    BAND NEUTROPHILS 3 %    LYMPHOCYTES 11 (L) 12 - 49 %    MONOCYTES 3 (L) 5 - 13 %    EOSINOPHILS 0 0 - 7 %    BASOPHILS 0 0 - 1 %    IMMATURE GRANULOCYTES 0 0.0 - 0.5 %    ABS. NEUTROPHILS 6.3 1.8 - 8.0 K/UL    ABS. LYMPHOCYTES 0.8 0.8 - 3.5 K/UL    ABS. MONOCYTES 0.2 0.0 - 1.0 K/UL    ABS. EOSINOPHILS 0.0 0.0 - 0.4 K/UL    ABS. BASOPHILS 0.0 0.0 - 0.1 K/UL    ABS. IMM.  GRANS. 0.0 0.00 - 0.04 K/UL    DF MANUAL      PLATELET COMMENTS Giant platelets      RBC COMMENTS OVALOCYTES  1+        RBC COMMENTS PARISH CELLS  1+        RBC COMMENTS MICROCYTOSIS  1+       METABOLIC PANEL, COMPREHENSIVE    Collection Time: 09/17/21 10:05 PM   Result Value Ref Range    Sodium 139 136 - 145 mmol/L    Potassium 4.5 3.5 - 5.1 mmol/L    Chloride 102 97 - 108 mmol/L    CO2 23 21 - 32 mmol/L    Anion gap 14 5 - 15 mmol/L    Glucose 108 (H) 65 - 100 mg/dL    BUN 20 6 - 20 MG/DL    Creatinine 2.06 (H) 0.70 - 1.30 MG/DL    BUN/Creatinine ratio 10 (L) 12 - 20      GFR est AA 38 (L) >60 ml/min/1.73m2    GFR est non-AA 32 (L) >60 ml/min/1.73m2    Calcium 8.8 8.5 - 10.1 MG/DL    Bilirubin, total 1.5 (H) 0.2 - 1.0 MG/DL    ALT (SGPT) 10 (L) 12 - 78 U/L    AST (SGOT) 14 (L) 15 - 37 U/L    Alk. phosphatase 128 (H) 45 - 117 U/L    Protein, total 6.9 6.4 - 8.2 g/dL    Albumin 3.1 (L) 3.5 - 5.0 g/dL    Globulin 3.8 2.0 - 4.0 g/dL    A-G Ratio 0.8 (L) 1.1 - 2.2     MAGNESIUM    Collection Time: 09/17/21 10:05 PM   Result Value Ref Range    Magnesium 1.5 (L) 1.6 - 2.4 mg/dL   PROTHROMBIN TIME + INR    Collection Time: 09/17/21 10:05 PM   Result Value Ref Range    INR 1.2 (H) 0.9 - 1.1      Prothrombin time 12.1 (H) 9.0 - 11.1 sec     Radiologic Studies -   CT Results  (Last 48 hours)               09/17/21 2216  CT HEAD WO CONT Final result    Impression:  No acute intracranial process. Imaging findings consistent with mild chronic microvascular ischemic change. There is a moderate to severe degree of cerebral atrophy. Narrative:  CLINICAL HISTORY: head trauma, on coumadin   INDICATION: head trauma, on coumadin   COMPARISON: 9/10/2021. CT dose reduction was achieved through use of a standardized protocol tailored   for this examination and automatic exposure control for dose modulation. TECHNIQUE: Serial axial images with a collimation of 5 mm were obtained from the   skull base through the vertex     FINDINGS:    There is sulcal and ventricular prominence. Confluent periventricular and   scattered foci of hypodensity in the cerebral white matter. There is no evidence   of an acute infarction, hemorrhage, or mass-effect. There is no evidence of   midline shift or hydrocephalus. Posterior fossa structures are unremarkable. No   extra-axial collections are seen. Mastoid air cells are well pneumatized and clear. There is no evidence of depressed skull fractures of soft tissue swelling. 09/17/21 2216  CT SPINE CERV WO CONT Final result    Impression:  There is no acute fracture or dislocation identified. Narrative:  EXAM: CT SPINE CERV WO CONT   CLINICAL HISTORY: pain after fall   INDICATION: pain after fall   COMPARISON:  None   TECHNIQUE:  Axial neck CT was performed. Noncontrast imaging obtained. Coronal   and sagittal reconstructions were performed. CT dose reduction was achieved through use of a standardized protocol tailored   for this examination and automatic exposure control for dose modulation. Osseous/bone algorithm was utilized. FINDINGS:   The vertebral bodies are anatomically aligned. There is no evidence of fracture   or subluxation. The prevertebral soft tissues are grossly within normal limits. The atlantodental interval is within normal limits. The craniocervical junction   is intact. There is no significant degree of canal or foraminal compromise demonstrated.              XR Results (most recent):      MDM  Number of Diagnoses or Management Options  Falls frequently: established and worsening  Hypoglycemia: established and worsening  Hypomagnesemia: minor  Multiple skin tears: minor     Amount and/or Complexity of Data Reviewed  Clinical lab tests: reviewed and ordered  Tests in the radiology section of CPT®: reviewed and ordered  Tests in the medicine section of CPT®: ordered and reviewed  Decide to obtain previous medical records or to obtain history from someone other than the patient: yes  Obtain history from someone other than the patient: yes (Patient's son in [place]    Risk of Complications, Morbidity, and/or Mortality  Presenting problems: high  Diagnostic procedures: high  Management options: low  General comments: Patient presented with fall and possible hypoglycemia he is on Coumadin so he could have had intracerebral hemorrhage subdural or epidural, patient was complaining of neck pain since prior fall which could be a an occult fracture, metabolic abnormality, electrolyte abnormality, infection, fracture of arms, skin tears. .[  ED Course as of Sep 18 0550   Sat Sep 18, 2021   0543 Labs are reviewed. Magnesium is borderline low at 1.5, CBC with hemoglobin 10.9 which is improved from patient's last hemoglobin of 9.6, BUN and creatinine were 22.06 which is improved from patient's prior creatinine, rest of CMP essentially is stable, PT is 12.1 with an INR 1.2 which is subtherapeutic for patient's atrial fibrillation, CT of the head neck were without acute process. [PS]   8829 Care discussed with patient's son and patient on cell phone. Discussed the fact that the patient was having multiple falls and having a hard time managing his sugar living by himself. I encouraged he and his son to discuss patient either moving closer to son or changing his living situation. We discussed the possibility of falling and breaking a hip or other injury. Son was supportive of patient changing his living situation to when it safer for him. Patient does not want to do this at this time. Patient was stable for discharge and we had to call neighbors to come pick him up. [PS]      ED Course User Index  [PS] Kirill Olivier MD     Medications - No data to display    No data found. Discharge note:  Pt re-evaluated and noted to be feeling better , ready for discharge. Updated pt  on all final lab and  X-ray  findings. Will follow up as instructed . All questions have been answered, pt voiced understanding and agreement with plan. Specific return precautions provided as well as instructions to return to the ED should sx worsen at any time. Vital signs stable for discharge. Diagnosis     Clinical Impression:     ICD-10-CM ICD-9-CM    1. Hypoglycemia  E16.2 251.2    2. Falls frequently  R29.6 V15.88    3. Multiple skin tears  T14. 8XXA 879.8    4. Hypomagnesemia  E83.42 275.2          PLAN:  1.    Discharge Medication List as of 9/17/2021 11:51 PM      START taking these medications    Details   magnesium oxide 400 mg magnesium tab Take 400 mg by mouth daily for 30 days. , Normal, Disp-30 Each, R-0         CONTINUE these medications which have NOT CHANGED    Details   warfarin (COUMADIN) 4 mg tablet Take 4 mg by mouth daily. , Historical Med      LOSARTAN PO Take  by mouth., Historical Med      folic acid (FOLVITE) 1 mg tablet TAKE ONE TABLET BY MOUTH ONCE DAILY, NormalPlease advise pt has 30 days to be seen has not been seen in over a year and that we do appts only nowDisp-30 Tab, R-0      NIFEdipine ER (PROCARDIA XL) 60 mg ER tablet Take 60 mg by mouth daily. , Historical Med      epoetin boubacar (PROCRIT) 20,000 unit/mL injection 1 mL by SubCUTAneous route every fourteen (14) days. To maintain Hgb above 10  Indications: ANEMIA DUE TO RENAL FAILURE, Normal, Disp-6 Vial, R-11      pravastatin (PRAVACHOL) 80 mg tablet Take 1 tablet by mouth nightly., Normal, Disp-90 tablet, R-3      cloNIDine (CATAPRES) 0.2 mg tablet Take 1 Tab by mouth three (3) times daily. , Normal, Disp-270 Tab, R-3      carvedilol (COREG) 25 mg tablet Take 1 Tab by mouth two (2) times daily (with meals). , Normal, Disp-180 Tab, R-3      aspirin delayed-release 81 mg tablet Take  by mouth daily. , Historical Med      Iyuqevaa-Exrb-Mfqncn-Hyalur Ac [id number] mg cap Take  by mouth., Historical Med      Krill-OM3-DHA-EPA-OM6-Lip-Astx (KRILL OIL) 1000-130(40-80) mg cap Take  by mouth., Historical Med      sodium polystyrene (KALEXATE) powder Take 15 g by mouth now. Monday, Wed and Friday., Historical Med           2. Follow-up Information     Follow up With Specialties Details Why Contact Darlyn Thomas NP Nurse Practitioner In 1 week Follow up todays symptoms. [address] [facility]  [phone]          Return to ED if worse     Disposition:  Discharged  Home     Please note, this dictation was completed with [employer], the [employer] voice recognition software.  Quite often unanticipated grammatical, syntax, homophones, and other interpretive errors are inadvertently transcribed by the computer software. Please disregard these errors. Please excuse any errors that have escaped final proof reading.

## 2021-09-18 NOTE — DISCHARGE INSTRUCTIONS
Eat regularly. Discuss blood sugar control with your primary care provider early next week. Discuss living situation with family and physician. He may not be safe for you to stay at home very self as you have had frequent falls and a difficult time regulating sugar at home. Return for uncontrolled pain, spreading redness drainage from the skin tears or evidence of infection. Your magnesium was a little low today, take Magnesium 400 mg daily to replace it and recheck kidney function studies, blood tests with your MD next week.

## 2023-05-16 RX ORDER — SODIUM POLYSTYRENE SULFONATE 4.1 MEQ/G
15 POWDER, FOR SUSPENSION ORAL; RECTAL
COMMUNITY

## 2023-05-16 RX ORDER — CARVEDILOL 25 MG/1
25 TABLET ORAL 2 TIMES DAILY WITH MEALS
COMMUNITY
Start: 2014-04-29

## 2023-05-16 RX ORDER — ASPIRIN 81 MG/1
TABLET ORAL DAILY
COMMUNITY

## 2023-05-16 RX ORDER — CLONIDINE HYDROCHLORIDE 0.2 MG/1
0.2 TABLET ORAL 3 TIMES DAILY
COMMUNITY
Start: 2014-07-16

## 2023-05-16 RX ORDER — NIFEDIPINE 60 MG/1
60 TABLET, EXTENDED RELEASE ORAL DAILY
COMMUNITY

## 2023-05-16 RX ORDER — WARFARIN SODIUM 4 MG/1
4 TABLET ORAL DAILY
COMMUNITY

## 2023-05-16 RX ORDER — FOLIC ACID 1 MG/1
1 TABLET ORAL DAILY
COMMUNITY
Start: 2016-07-12

## 2023-05-16 RX ORDER — PRAVASTATIN SODIUM 80 MG/1
1 TABLET ORAL NIGHTLY
COMMUNITY
Start: 2014-08-25